# Patient Record
Sex: FEMALE | Race: WHITE
[De-identification: names, ages, dates, MRNs, and addresses within clinical notes are randomized per-mention and may not be internally consistent; named-entity substitution may affect disease eponyms.]

---

## 2023-05-10 ENCOUNTER — HOSPITAL ENCOUNTER (OUTPATIENT)
Dept: HOSPITAL 75 - PREOP | Age: 53
LOS: 1 days | Discharge: HOME | End: 2023-05-11
Attending: SURGERY
Payer: COMMERCIAL

## 2023-05-10 VITALS — BODY MASS INDEX: 31.25 KG/M2 | HEIGHT: 69.02 IN | WEIGHT: 210.98 LBS

## 2023-05-10 DIAGNOSIS — Z01.818: Primary | ICD-10-CM

## 2023-05-22 ENCOUNTER — HOSPITAL ENCOUNTER (OUTPATIENT)
Dept: HOSPITAL 75 - ENDO | Age: 53
Discharge: HOME | End: 2023-05-22
Attending: SURGERY
Payer: COMMERCIAL

## 2023-05-22 VITALS — DIASTOLIC BLOOD PRESSURE: 74 MMHG | SYSTOLIC BLOOD PRESSURE: 133 MMHG

## 2023-05-22 VITALS — DIASTOLIC BLOOD PRESSURE: 62 MMHG | SYSTOLIC BLOOD PRESSURE: 113 MMHG

## 2023-05-22 VITALS — HEIGHT: 69.02 IN | BODY MASS INDEX: 31.25 KG/M2 | WEIGHT: 210.98 LBS

## 2023-05-22 VITALS — SYSTOLIC BLOOD PRESSURE: 139 MMHG | DIASTOLIC BLOOD PRESSURE: 83 MMHG

## 2023-05-22 VITALS — SYSTOLIC BLOOD PRESSURE: 115 MMHG | DIASTOLIC BLOOD PRESSURE: 65 MMHG

## 2023-05-22 DIAGNOSIS — F17.210: ICD-10-CM

## 2023-05-22 DIAGNOSIS — K64.8: ICD-10-CM

## 2023-05-22 DIAGNOSIS — E66.9: ICD-10-CM

## 2023-05-22 DIAGNOSIS — K57.30: ICD-10-CM

## 2023-05-22 DIAGNOSIS — Z12.11: Primary | ICD-10-CM

## 2023-05-22 DIAGNOSIS — D12.0: ICD-10-CM

## 2023-05-22 DIAGNOSIS — Z28.310: ICD-10-CM

## 2023-05-22 NOTE — PROGRESS NOTE-POST OPERATIVE
Post-Operative Progess Note


Surgeon (s)/Assistant (s)


Surgeon


BERNARD NORRIS DO


Assistant:  none





Pre-Operative Diagnosis


Screening





Post-Operative Diagnosis





Polyp


Early diverticula


int hemorrhoids





Procedure & Operative Findings


Date of Procedure


5/22/23


Procedure Performed/Findings


Colonoscopy with hot biopsy





PROCEDURE NOTE:


After informed consent was obtained, the patient was brought to the endoscopy


suite, placed in bed in left lateral decubitus position.  She  was administered


IV sedation by the CRNA who then monitored her vitals the entire time, heart


rate, blood pressure and pulse ox and the scope was inserted, pushed all the way


to about 130 cm and pushed into the cecum, took a picture of appendiceal orifice

and 


noted the ileocecal valve. In the cecum right near the appendiceal opening I saw


a polyp and elected to do a hot biopsy. Then slowly withdrew the scope 

insufflating 


to look circumferentially at the walls starting in the cecum, up the ascending 

colon 


to the hepatic flexure, then down the transverse colon, splenic flexure, into 

the 


descending colon down in the sigmoid and then into the rectal vault and 

retroflexed 


the scope. Took picture of the internal hemorrhoids.  She had the beginnings of 

small


diverticula.





The patient tolerated the procedure.  She was recovered in endoscopy suite.





Recommended for repeat colonoscopy in 5 years.


Anesthesia Type


IV sedation by CRNA





Estimated Blood Loss


Estimated blood loss (mL):  scant





Specimens/Packing


Specimens Removed


cecal polyp











BERNARD NORRIS DO               May 22, 2023 11:05

## 2023-05-22 NOTE — ENDOSCOPY DISCHARGE INSTRUCT
Endo Procedure/Findings


Findings


1.:  Polyp


2.:  Diverticulosis


3.:  Internal Hemorrhoids





Discharge Instructions


-


Activity: You might feel a little sleepy until tomorrow.  This is due to the 

medicine you received to relax you.





Until tomorrow, you should:  


   NOT drive a car, operate machinery or power tools.


   NOT drink any alcoholic beverages.


   NOT make any important decisions or sign importortant papers.





Do not return to work until tomorrow, unless otherwise instructed. Resume 

previous activities tomorrow.





Diet: Start by taking liquids.  If you tolerate liquids, advance to solid food.


1.:  Colonscopy in 5 years





Notify Physician


-


If you experience excessive bleeding, unusual abdominal pain, fever, or chest 

pain, contact your doctor immediately.





Follow-Up:


Other Follow up


in my office in one week











BERNARD NORRIS DO               May 22, 2023 11:06

## 2023-05-22 NOTE — PROGRESS NOTE-PRE OPERATIVE
Pre-Operative Progress Note


Date of Available H&P:  May 4, 2023


Date H&P Reviewed:  May 22, 2023


Time H&P Reviewed:  10:02


History & Physical:  H&P Reviewed, Patient Examed, No changes noted


Pre-Operative Diagnosis:  BERNARD Grubbs DO               May 22, 2023 10:03

## 2023-05-22 NOTE — ANESTHESIA-GENERAL POST-OP
MAC


Patient Condition


Mental Status/LOC:  Same as Preop


Cardiovascular:  Satisfactory


Nausea/Vomiting:  Absent


Respiratory:  Satisfactory


Pain:  Controlled


Complications:  Absent





Post Op Complications


Complications


None





Follow Up Care/Instructions


Patient Instructions


None needed.





Anesthesiology Discharge Order


Discharge Order


Patient is doing well, no complaints, stable vital signs, no apparent adverse 

anesthesia problems.   


No complications reported per nursing.











ROGERIO RODRIGUEZ CRNA              May 22, 2023 12:38